# Patient Record
Sex: FEMALE | Race: WHITE | URBAN - METROPOLITAN AREA
[De-identification: names, ages, dates, MRNs, and addresses within clinical notes are randomized per-mention and may not be internally consistent; named-entity substitution may affect disease eponyms.]

---

## 2017-01-02 ENCOUNTER — HOSPITAL ENCOUNTER (OUTPATIENT)
Dept: PHYSICAL THERAPY | Facility: CLINIC | Age: 62
Setting detail: THERAPIES SERIES
End: 2017-01-02
Attending: ORTHOPAEDIC SURGERY
Payer: COMMERCIAL

## 2017-01-02 PROCEDURE — 40000718 ZZHC STATISTIC PT DEPARTMENT ORTHO VISIT: Performed by: PHYSICAL THERAPIST

## 2017-01-02 PROCEDURE — 97161 PT EVAL LOW COMPLEX 20 MIN: CPT | Mod: GP | Performed by: PHYSICAL THERAPIST

## 2017-01-02 PROCEDURE — 97140 MANUAL THERAPY 1/> REGIONS: CPT | Mod: GP | Performed by: PHYSICAL THERAPIST

## 2017-01-02 PROCEDURE — 97110 THERAPEUTIC EXERCISES: CPT | Mod: GP | Performed by: PHYSICAL THERAPIST

## 2017-01-04 NOTE — PROGRESS NOTES
" 01/02/17 1300   General Information   Type of Visit Initial OP Ortho PT Evaluation   Start of Care Date 01/02/17   Referring Physician Dr. Deric MD   Patient/Family Goals Statement To reduce pain, sleep better   Orders Evaluate and Treat   Orders Comment \"Follow up in 6 weeks if no improvement to consider trochanteric bursa injection\"   Date of Order 12/21/16   Insurance Type [employer]   Insurance Comments/Visits Authorized >60 requires pre auth   Medical Diagnosis Sciatica of left side; osteoporosis; hip bursitis, left   Surgical/Medical history reviewed Yes   Precautions/Limitations no known precautions/limitations   General Information Comments Knee: microfracture procedure; brain aneurism - coiling 7/13/16; shoulder SAD/DCE       Present No   Body Part(s)   Body Part(s) Hip   Presentation and Etiology   Pertinent history of current problem (include personal factors and/or comorbidities that impact the POC) Patient reports: has had bilateral hip pain for several months now.  Was diagnosed with right hip OA.  Sciatic N issues on the left.  Left hip is worse at night.  Sleeps on left side preferable.  Is hoping for a cortisone injection, but MD wanted to start with PT.  Reports orthopedic history of right SAD and DCE, many years ago a microfracture proceducre (so many years ago, can't remember which knee).   Impairments A. Pain;E. Decreased flexibility;H. Impaired gait;M. Locking or catching;R. Other   Impairment comment Pain at night going down left leg   Functional Limitations perform activities of daily living;perform required work activities;perform desired leisure / sports activities   Symptom Location Right groin; left lateral thigh, knee, and calf   How/Where did it occur From insidious onset   Onset date of current episode/exacerbation 10/10/16  (Gradually for months)   Chronicity Chronic   Pain rating (0-10 point scale) Best (/10);Worst (/10)   Best (/10) 2   Worst (/10) 7   Pain " quality A. Sharp;B. Dull;C. Aching   Frequency of pain/symptoms B. Intermittent   Pain/symptoms are: The same all the time   Pain/symptoms exacerbated by A. Sitting;G. Certain positions;M. Other   Pain exacerbation comment Standing for periods of time   Pain/symptoms eased by B. Walking;E. Changing positions   Progression of symptoms since onset: Worsened   Prior Level of Function   Prior Level of Function-Mobility Independent   Prior Level of Function-ADLs Independent   Current Level of Function   Current Community Support Family/friend caregiver   Patient role/employment history A. 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 No   Per patient - Fall with injury in past year? No   Is patient a fall risk? No   Functional Scales   Functional Scales Other   Other Scales  LEFS=41/80  (48.75% disability)   Hip Objective Findings   Side (if bilateral, select both right and left) Left  (Bilateral)   Gait/Locomotion Decreased stance time Rt LE; bilateral compensated Trendelenburg   Straight Leg Raise Test Right=90 deg; Left=70 deg with increased posterior knee pain   Left Hip Flexion PROM Right=100 deg with end range groin pain; Left=110 deg   Left Hip ER PROM Kraig=40 deg   Left Hip IR PROM Kraig=5 deg   Left Hip Flexion Strength Kraig=5/5   Left Hip Abduction Strength Right=2/5; Left=3+/5   Left Hip Extension Strength Kraig=4/5   Left Hip IR Strength Kraig=4/5   Left Hip ER Strength Kraig=4/5   Left Knee Flexion Strength Kraig=4/5   Left Knee Extension Strength Right=5/5 pain free; Left=4/5 with increased hip pain   Planned Therapy Interventions   Planned Therapy Interventions ADL retraining;balance training;joint mobilization;manual therapy;motor coordination training;neuromuscular re-education;ROM;strengthening;stretching   Planned Modality Interventions   Planned Modality Interventions  Cryotherapy   Clinical Impression   Criteria for Skilled Therapeutic Interventions Met yes, treatment indicated   PT Diagnosis Bilateral hip OA; left sciatic N tension; global hip weakness   Influenced by the following impairments Pain; weakness; impaired ROM   Functional limitations due to impairments Pain and difficulty sitting >10 min, driving, performing sit <-> stand   Clinical Presentation Stable/Uncomplicated   Clinical Presentation Rationale Gradually worsening; chronic   Clinical Decision Making (Complexity) Low complexity   Therapy Frequency 1 time/week   Predicted Duration of Therapy Intervention (days/wks) 6 weeks   Risk & Benefits of therapy have been explained Yes   Patient, Family & other staff in agreement with plan of care Yes   Clinical Impression Comments Ellie is a pleasant    Education Assessment   Preferred Learning Style Listening;Demonstration;Pictures/video   Barriers to Learning No barriers   ORTHO GOALS   PT Ortho Eval Goals 1;2;3   Ortho Goal 1   Goal Description Following PT interventions, patient will have >=4/5 lorena hip ABD strength for improved frontal plane stability with weightbearing activities.   Target Date 02/13/17   Ortho Goal 2   Goal Description Following PT interventions, patient will be able to perform sit <> stand with <3/10 bilateral hip pain.   Target Date 02/13/17   Ortho Goal 3   Goal Description Following PT interventions, patient will be independent with her HEP to reduce future occurrence of pain and disability.   Target Date 01/16/17   Total Evaluation Time   Total Evaluation Time 20 min       Joanne Jacobo PT, DPT  Doctor of Physical Therapy #[id number]  [facility]  [phone]  [email or web address]

## 2017-01-09 ENCOUNTER — HOSPITAL ENCOUNTER (OUTPATIENT)
Dept: PHYSICAL THERAPY | Facility: CLINIC | Age: 62
Setting detail: THERAPIES SERIES
End: 2017-01-09
Attending: ORTHOPAEDIC SURGERY
Payer: COMMERCIAL

## 2017-01-09 PROCEDURE — 97110 THERAPEUTIC EXERCISES: CPT | Mod: GP | Performed by: PHYSICAL THERAPIST

## 2017-01-09 PROCEDURE — 40000718 ZZHC STATISTIC PT DEPARTMENT ORTHO VISIT: Performed by: PHYSICAL THERAPIST

## 2017-01-09 PROCEDURE — 97140 MANUAL THERAPY 1/> REGIONS: CPT | Mod: GP | Performed by: PHYSICAL THERAPIST

## 2017-01-18 ENCOUNTER — HOSPITAL ENCOUNTER (OUTPATIENT)
Dept: PHYSICAL THERAPY | Facility: CLINIC | Age: 62
Setting detail: THERAPIES SERIES
End: 2017-01-18
Attending: ORTHOPAEDIC SURGERY
Payer: COMMERCIAL

## 2017-01-18 PROCEDURE — 97112 NEUROMUSCULAR REEDUCATION: CPT | Mod: GP | Performed by: PHYSICAL THERAPIST

## 2017-01-18 PROCEDURE — 97110 THERAPEUTIC EXERCISES: CPT | Mod: GP | Performed by: PHYSICAL THERAPIST

## 2017-01-18 PROCEDURE — 40000718 ZZHC STATISTIC PT DEPARTMENT ORTHO VISIT: Performed by: PHYSICAL THERAPIST

## 2017-03-15 NOTE — PROGRESS NOTES
Outpatient Physical Therapy Discharge Note     Patient: Ellie Pascual  : 1955    Beginning/End Dates of Reporting Period:  17 to 3/15/2017    Referring Provider: Dr. Deric MD    Therapy Diagnosis: Bilateral hip OA; left sciatic N tension; global hip weakness     Client Self Report: The hips are feeling really good.  Anticipates 1 more visit.      Objective Measurements:  Objective Measure: Hip ABD strength  Details: Lorena=3+/5       Objective Measure: Gait  Details: No Trendelenburg noted          Goals:  Goal Identifier     Goal Description Following PT interventions, patient will have >=4/5 lorena hip ABD strength for improved frontal plane stability with weightbearing activities.   Target Date 17   Date Met      Progress:     Goal Identifier     Goal Description Following PT interventions, patient will be able to perform sit <> stand with <3/10 bilateral hip pain.   Target Date 17   Date Met  17   Progress:     Goal Identifier     Goal Description Following PT interventions, patient will be independent with her HEP to reduce future occurrence of pain and disability.   Target Date 17   Date Met  17   Progress:       Progress Toward Goals:   Progress this reporting period: Ellie attended 3 physical therapy sessions to address her lorena hip pain.  She made excellent progress with pain reduction and independence with her HEP.  At this time Ellie is working independently with her HEP to progress hip strength.      Plan:  Discharge from therapy.    Discharge:    Reason for Discharge: Ellie has met 2 of 3 goals; working independently to achieve 3rd goal    Equipment Issued: Theraband    Discharge Plan: Patient to continue home program.    Joanne Jacobo, PT, DPT  Doctor of Physical Therapy #[id number]  [facility]  [phone]  [email or web address]